# Patient Record
Sex: FEMALE | Race: ASIAN | NOT HISPANIC OR LATINO | URBAN - METROPOLITAN AREA
[De-identification: names, ages, dates, MRNs, and addresses within clinical notes are randomized per-mention and may not be internally consistent; named-entity substitution may affect disease eponyms.]

---

## 2020-12-28 ENCOUNTER — EMERGENCY (EMERGENCY)
Facility: HOSPITAL | Age: 70
LOS: 1 days | Discharge: HOME | End: 2020-12-28
Attending: EMERGENCY MEDICINE
Payer: MEDICARE

## 2020-12-28 VITALS
RESPIRATION RATE: 18 BRPM | SYSTOLIC BLOOD PRESSURE: 147 MMHG | DIASTOLIC BLOOD PRESSURE: 75 MMHG | HEART RATE: 79 BPM | TEMPERATURE: 98 F | OXYGEN SATURATION: 98 %

## 2020-12-28 VITALS
HEART RATE: 84 BPM | RESPIRATION RATE: 17 BRPM | SYSTOLIC BLOOD PRESSURE: 159 MMHG | HEIGHT: 61 IN | DIASTOLIC BLOOD PRESSURE: 82 MMHG | OXYGEN SATURATION: 98 % | WEIGHT: 115.08 LBS | TEMPERATURE: 98 F

## 2020-12-28 PROCEDURE — 12002 RPR S/N/AX/GEN/TRNK2.6-7.5CM: CPT

## 2020-12-28 PROCEDURE — 90715 TDAP VACCINE 7 YRS/> IM: CPT

## 2020-12-28 PROCEDURE — 99284 EMERGENCY DEPT VISIT MOD MDM: CPT | Mod: 25

## 2020-12-28 PROCEDURE — 73590 X-RAY EXAM OF LOWER LEG: CPT

## 2020-12-28 PROCEDURE — 90471 IMMUNIZATION ADMIN: CPT

## 2020-12-28 PROCEDURE — 73590 X-RAY EXAM OF LOWER LEG: CPT | Mod: 26,RT

## 2020-12-28 PROCEDURE — 99283 EMERGENCY DEPT VISIT LOW MDM: CPT | Mod: 25

## 2020-12-28 RX ORDER — TETANUS TOXOID, REDUCED DIPHTHERIA TOXOID AND ACELLULAR PERTUSSIS VACCINE, ADSORBED 5; 2.5; 8; 8; 2.5 [IU]/.5ML; [IU]/.5ML; UG/.5ML; UG/.5ML; UG/.5ML
0.5 SUSPENSION INTRAMUSCULAR ONCE
Refills: 0 | Status: COMPLETED | OUTPATIENT
Start: 2020-12-28 | End: 2020-12-28

## 2020-12-28 RX ADMIN — TETANUS TOXOID, REDUCED DIPHTHERIA TOXOID AND ACELLULAR PERTUSSIS VACCINE, ADSORBED 0.5 MILLILITER(S): 5; 2.5; 8; 8; 2.5 SUSPENSION INTRAMUSCULAR at 17:44

## 2020-12-28 RX ADMIN — Medication 100 MILLIGRAM(S): at 17:44

## 2020-12-28 NOTE — ED ADULT NURSE NOTE - NSIMPLEMENTINTERV_GEN_ALL_ED
Implemented All Fall Risk Interventions:  [place] to call system. Call bell, personal items and telephone within reach. Instruct patient to call for assistance. Room bathroom lighting operational. Non-slip footwear when patient is off stretcher. Physically safe environment: no spills, clutter or unnecessary equipment. Stretcher in lowest position, wheels locked, appropriate side rails in place. Provide visual cue, wrist band, yellow gown, etc. Monitor gait and stability. Monitor for mental status changes and reorient to person, place, and time. Review medications for side effects contributing to fall risk. Reinforce activity limits and safety measures with patient and family.

## 2020-12-28 NOTE — ED PROVIDER NOTE - PROGRESS NOTE DETAILS
used  service to explain laceration care, return precautions and time frame for suture removal - Evette Torres PA-C

## 2020-12-28 NOTE — ED PROVIDER NOTE - NSFOLLOWUPINSTRUCTIONS_ED_ALL_ED_FT

## 2020-12-28 NOTE — ED PROVIDER NOTE - PATIENT PORTAL LINK FT
You can access the FollowMyHealth Patient Portal offered by [facility] by registering at the following website: [email or web address]. By joining [employer]’s FollowMyHealth portal, you will also be able to view your health information using other applications (apps) compatible with our system.

## 2020-12-28 NOTE — ED PROVIDER NOTE - OBJECTIVE STATEMENT
69 y/o female hx DM presents to the ED for mechanical trip and fall when getting off a fishing boat at approx 6 pm. hit right shin. fell forward and broke fall on hands, no headstrike or LOC. tdap unknown. ambulating/bearing weight without difficulty    Mongolian - [id number]

## 2020-12-28 NOTE — ED PROCEDURE NOTE - PROCEDURE ADDITIONAL DETAILS
4 loose sutures placed to approximate wound better 2/2 significant gaping however given concern for infection 2/2 delayed closure did not close wound entirely

## 2020-12-28 NOTE — ED ADULT NURSE NOTE - OBJECTIVE STATEMENT
71 y/o female presents to ED from home c/o R shin abrasion s/p misstep off boat while fishing yesterday. Takes daily aspirin. Urdu speaking -  ipad used for information. 71 y/o female presents to ED from home c/o R shin abrasion s/p misstep off boat while fishing yesterday. Takes daily aspirin. No active bleeding to site. No redness, inflammation, debris noted to area. States she has pain but is declining pain medication currently. Safety and comfort provided.

## 2020-12-28 NOTE — ED ADULT TRIAGE NOTE - CHIEF COMPLAINT QUOTE
Pashto speaking pt bib daughter for abrasion to R shin s/p misstep off boat when fishing yesterday.  pt currently taking aspirin daily

## 2020-12-28 NOTE — ED ADULT NURSE NOTE - CHIEF COMPLAINT QUOTE
Latvian speaking pt bib daughter for abrasion to R shin s/p misstep off boat when fishing yesterday.  pt currently taking aspirin daily

## 2020-12-28 NOTE — ED PROVIDER NOTE - ATTENDING CONTRIBUTION TO CARE
Attending MD Lam:   I personally have seen and examined this patient.  Physician assistant note reviewed and agree on plan of care and except where noted.  See below for details.    Seen in [place]  Hebrew  [id number]    70F with PMH/PSH including DM presents to the ED with R anterior leg wound sustained at 6pm on 12/27/20.  Reports that yesterday she was getting off a fishing boat, tripped and hit the anterior aspect of R shin, reports fell onto knees, denies knee pain.  Denies preceding dizziness, weakness, sensory changes.  Denies LOC, hitting head.  Denies any other complaints.  Denies fevers, chills.  A ten (10) point review of systems was negative other than as stated in the HPI or elsewhere in the chart.    Exam:   General: NAD  HENT: head NCAT, airway patent  Lungs: lungs CTAB with good inspiratory effort, no wheezing, no rhonchi, no rales  Cardiac: +S1S2, no m/r/g  GI: abdomen soft with +BS, NT, ND  MSK: FROM at neck, no tenderness to midline palpation, +mild tenderness to palpation at area around wound, no calf tenderness, swelling, erythema or warmth  Neuro: moving all extremities with 5/5 strength bilateral upper and lower extremities, sensory grossly intact, no gross neuro deficits  Psych: normal mood and affect   Skin: 3.5 cm laceration in backwards L shape, gaping, no active bleeding, area without surrounding erythema    A/P: 70F with laceration to the R anterior shin, tetanus unknown, given delay in presentation, will not repair, will only loosely approximate, discussed with patient with , reviewed return to ED precautions, signs/symptoms of infection, verbalized understanding.  Will loosely approximate, give abx given delay in presentation and history of DM.

## 2021-05-10 PROBLEM — E11.9 TYPE 2 DIABETES MELLITUS WITHOUT COMPLICATIONS: Chronic | Status: ACTIVE | Noted: 2020-12-28

## 2021-05-11 PROBLEM — Z00.00 ENCOUNTER FOR PREVENTIVE HEALTH EXAMINATION: Status: ACTIVE | Noted: 2021-05-11

## 2021-05-14 ENCOUNTER — APPOINTMENT (OUTPATIENT)
Dept: ORTHOPEDIC SURGERY | Facility: CLINIC | Age: 71
End: 2021-05-14

## 2021-05-21 ENCOUNTER — APPOINTMENT (OUTPATIENT)
Dept: ORTHOPEDIC SURGERY | Facility: CLINIC | Age: 71
End: 2021-05-21
Payer: MEDICARE

## 2021-05-21 VITALS
DIASTOLIC BLOOD PRESSURE: 63 MMHG | TEMPERATURE: 97.7 F | HEART RATE: 73 BPM | BODY MASS INDEX: 21.6 KG/M2 | SYSTOLIC BLOOD PRESSURE: 106 MMHG | WEIGHT: 110 LBS | HEIGHT: 60 IN

## 2021-05-21 DIAGNOSIS — M15.8 OTHER POLYOSTEOARTHRITIS: ICD-10-CM

## 2021-05-21 DIAGNOSIS — M67.441 GANGLION, RIGHT HAND: ICD-10-CM

## 2021-05-21 DIAGNOSIS — M19.90 UNSPECIFIED OSTEOARTHRITIS, UNSPECIFIED SITE: ICD-10-CM

## 2021-05-21 DIAGNOSIS — Z86.39 PERSONAL HISTORY OF OTHER ENDOCRINE, NUTRITIONAL AND METABOLIC DISEASE: ICD-10-CM

## 2021-05-21 DIAGNOSIS — Z78.9 OTHER SPECIFIED HEALTH STATUS: ICD-10-CM

## 2021-05-21 PROCEDURE — 99072 ADDL SUPL MATRL&STAF TM PHE: CPT

## 2021-05-21 PROCEDURE — 73140 X-RAY EXAM OF FINGER(S): CPT | Mod: F5

## 2021-05-21 PROCEDURE — 99203 OFFICE O/P NEW LOW 30 MIN: CPT

## 2021-05-21 RX ORDER — SITAGLIPTIN 100 MG/1
TABLET, FILM COATED ORAL
Refills: 0 | Status: ACTIVE | COMMUNITY

## 2021-05-21 RX ORDER — METFORMIN HYDROCHLORIDE 625 MG/1
TABLET ORAL
Refills: 0 | Status: ACTIVE | COMMUNITY

## 2021-05-21 NOTE — HISTORY OF PRESENT ILLNESS
[FreeTextEntry1] : She comes in today for evaluation of right thumb pain, which began 2 months ago. She denies any injury. She describes pain and swelling at the base of her right thumb. She is unable to use the thumb when performing daily activities. She rates her pain a 3 out of 10 at this time. \par \par She is accompanied by her daughter today who is helping with translation to Chinese.

## 2021-05-21 NOTE — ADDENDUM
[FreeTextEntry1] : I, America Carpenter, acted solely as a scribe for Dr. Vance on this date 05/14/2021.

## 2021-05-21 NOTE — END OF VISIT
[FreeTextEntry3] : This note was written by America Carpenter on 05/14/2021 acting solely as a scribe for Dr. Ed Vance.\par  \par All medical record entries made by the Scribe were at my, Dr. Ed Vance, direction and personally dictated by me on 05/14/2021. I have personally reviewed the chart and agree that the record accurately reflects my personal performance of the history, physical exam, assessment and plan.

## 2021-05-21 NOTE — DISCUSSION/SUMMARY
[FreeTextEntry1] : She has findings consistent with right thumb pain and swelling secondary to advanced IP joint arthritis and an associated mucous cyst.\par \par I had a discussion regarding today's visit, the prognosis of this diagnosis, and treatment recommendations and options. At this time, we discussed the options of icing and observation, attempted aspiration, or surgery.  With regard to surgery, we discussed either excision of the cyst and/or IP joint fusion for the arthritis.  She deferred aspiration.  She agreed to proceed with observation, as she is concerned about aspiration.  She will return to the office if she has worsening symptoms in the future. \par \par The patient and her daughter have agreed to this plan of management and has expressed full understanding.  All questions were fully answered to the patient's satisfaction.\par \par My cumulative time spent on this patient's visit included: Preparation for the visit, review of the medical records, review of pertinent diagnostic studies, examination and counseling of the patient on the above diagnosis, treatment plan and prognosis, orders of diagnostic tests, medication and/or appropriate procedures and documentation in the medical records of today's visit.

## 2021-05-21 NOTE — PHYSICAL EXAM
[de-identified] : - Constitutional: This is a female in no obvious distress.  She is accompanied by her daughter today. \par - Psych: Patient is alert and oriented to person, place and time.  Patient has a normal mood and affect.\par - Cardiovascular: Normal pulses throughout the upper extremities.  No significant varicosities are noted in the upper extremities. \par - Neuro: Strength and sensation are intact throughout the upper extremities.  Patient has normal coordination.\par - Respiratory:  Patient exhibits no evidence of shortness of breath or difficulty breathing.\par - Skin: No rashes, lesions, or other abnormalities are noted in the upper extremities.\par \par ---\par  \par Examination of her right thumb demonstrates obvious arthritis at the IP joint.  She has a mucous cyst emanating from the dorsal radial aspect of the joint.  She has tenderness.  She has full extension with approximate 20 degrees of flexion.  She is neurovascularly intact distally.\par  [de-identified] : AP, lateral, and oblique radiographs of the right thumb demonstrate

## 2021-10-12 NOTE — ED PROCEDURE NOTE - PROCEDURE NAME, MLM
1458: Pt to ED via [facility] for c/o \"failure to thrive. \" EMS reports pt has been feeling too weak to get off the couch and has not been eating for a few days. EMS reports pt has hx of CKD stage 4. Vitals per EMS: /52, P 85, RR 16, SaO2 97% on room air, Glucose 105. Pt arrives alert and oriented X4, c/o fatigue and weakness. Pt denies any pain. Pt placed on pulse oximetry for vitals assessment and pt saturation is 45% on room air. Pt denies SOB, but does exhibit diaphragmatic breathing and can only speak in short phrases. Pt placed on NRB and saturation increases to 85%. Dr. Gasper Sandhu 336 notified and gives verbal order for high-flow nasal cannula. Writer back to bedside, pt oxygen saturation increased to 100% on NRB. Pt titrated down to 6L oxygen via NC, saturation maintaining between 93-96%.
Laceration Repair

## 2023-02-16 ENCOUNTER — APPOINTMENT (OUTPATIENT)
Dept: CARDIOLOGY | Facility: CLINIC | Age: 73
End: 2023-02-16
